# Patient Record
Sex: MALE | Race: WHITE | NOT HISPANIC OR LATINO | URBAN - METROPOLITAN AREA
[De-identification: names, ages, dates, MRNs, and addresses within clinical notes are randomized per-mention and may not be internally consistent; named-entity substitution may affect disease eponyms.]

---

## 2021-08-26 ENCOUNTER — OFFICE VISIT (OUTPATIENT)
Dept: DERMATOLOGY | Facility: CLINIC | Age: 56
End: 2021-08-26
Payer: COMMERCIAL

## 2021-08-26 VITALS — OXYGEN SATURATION: 98 % | DIASTOLIC BLOOD PRESSURE: 79 MMHG | SYSTOLIC BLOOD PRESSURE: 126 MMHG | HEART RATE: 57 BPM

## 2021-08-26 DIAGNOSIS — L81.4 LENTIGO: ICD-10-CM

## 2021-08-26 DIAGNOSIS — D18.01 CHERRY ANGIOMA: ICD-10-CM

## 2021-08-26 DIAGNOSIS — L82.1 SEBORRHEIC KERATOSIS: Primary | ICD-10-CM

## 2021-08-26 DIAGNOSIS — D22.9 MULTIPLE BENIGN NEVI: ICD-10-CM

## 2021-08-26 PROCEDURE — 99203 OFFICE O/P NEW LOW 30 MIN: CPT | Performed by: PHYSICIAN ASSISTANT

## 2021-08-26 RX ORDER — LEVOTHYROXINE SODIUM 75 UG/1
75 TABLET ORAL DAILY
COMMUNITY

## 2021-08-26 NOTE — NURSING NOTE
Chief Complaint   Patient presents with     Skin Check       Vitals:    08/26/21 0937   BP: 126/79   BP Location: Right arm   Patient Position: Sitting   Cuff Size: Adult Large   Pulse: 57   SpO2: 98%     Wt Readings from Last 1 Encounters:   No data found for Wt       Brittany Escalante LPN .................8/26/2021

## 2021-08-26 NOTE — LETTER
8/26/2021         RE: Maynor Paris  [address] [facility] [place] [address]        Dear Colleague,    Thank you for referring your patient, Maynor Paris, to the [facility]. Please see a copy of my visit note below.    Maynor Paris is an extremely pleasant 56 year old year old male patient here today for  Patient has no other skin complaints today.  Remainder of the HPI, Meds, PMH, Allergies, FH, and SH was reviewed in chart.    Pertinent Hx:  No personal history of skincancer.  Family history of skin cancer.   History reviewed. No pertinent past medical history.    No past surgical history on file.     Family History   Problem Relation Age of Onset     Skin Cancer Father         BCC, SCC, & Melanoma        Social History     Socioeconomic History     Marital status:      Spouse name: Not on file     Number of children: Not on file     Years of education: Not on file     Highest education level: Not on file   Occupational History     Not on file   Tobacco Use     Smoking status: Never Smoker     Smokeless tobacco: Never Used   Substance and Sexual Activity     Alcohol use: Not on file     Drug use: Not on file     Sexual activity: Not on file   Other Topics Concern     Not on file   Social History Narrative     Not on file     Social Determinants of Health     Financial Resource Strain:      Difficulty of Paying Living Expenses:    Food Insecurity:      Worried About Running Out of Food in the Last Year:      Ran Out of Food in the Last Year:    Transportation Needs:      Lack of Transportation (Medical):      Lack of Transportation (Non-Medical):    Physical Activity:      Days of Exercise per Week:      Minutes of Exercise per Session:    Stress:      Feeling of Stress :    Social Connections:      Frequency of Communication with Friends and Family:      Frequency of Social Gatherings with Friends and Family:      Attends Jain Services:      Active Member of Clubs or  Organizations:      Attends Club or Organization Meetings:      Marital Status:    Intimate Partner Violence:      Fear of Current or Ex-Partner:      Emotionally Abused:      Physically Abused:      Sexually Abused:        Outpatient Encounter Medications as of 8/26/2021   Medication Sig Dispense Refill     levothyroxine (SYNTHROID/LEVOTHROID) 75 MCG tablet Take 75 mcg by mouth daily       No facility-administered encounter medications on file as of 8/26/2021.             O:   NAD, WDWN, Alert & Oriented, Mood & Affect wnl, Vitals stable   Here today alone   /79 (BP Location: Right arm, Patient Position: Sitting, Cuff Size: Adult Large)   Pulse 57   SpO2 98%    General appearance normal   Vitals stable   Alert, oriented and in no acute distress     Brown papules and macules with regular pigment network and borders on torso and extremities   Stuck on papules and brown macules on trunk and ext   Red papules on trunk     The remainder of skin exam is normal       Eyes: Conjunctivae/lids:Normal     ENT: Lips: normal    MSK:Normal    Pulm: Breathing Normal    Neuro/Psych: Orientation:Alert and Orientedx3 ; Mood/Affect:normal   A/P:  1. Seborrheic keratosis, lentigo, angioma, benign nevi   BENIGN LESIONS DISCUSSED WITH PATIENT:  I discussed the specifics of tumor, prognosis, and genetics of benign lesions.  I explained that treatment of these lesions would be purely cosmetic and not medically neccessary.  I discussed with patient different removal options including excision, cautery and /or laser.      Nature and genetics of benign skin lesions dicussed with patient.  Signs and Symptoms of skin cancer discussed with patient.  ABCDEs of melanoma reviewed with patient.  Patient encouraged to perform monthly skin exams.  UV precautions reviewed with patient  Risks of non-melanoma skin cancer discussed with patient   Return to clinic in one year or sooner if needed.         Again, thank you for allowing me to  participate in the care of your patient.        Sincerely,        Celine Dey PA-C

## 2021-08-30 NOTE — PROGRESS NOTES
Maynor Paris is an extremely pleasant 56 year old year old male patient here today for  Patient has no other skin complaints today.  Remainder of the HPI, Meds, PMH, Allergies, FH, and SH was reviewed in chart.    Pertinent Hx:  No personal history of skincancer.  Family history of skin cancer.   History reviewed. No pertinent past medical history.    No past surgical history on file.     Family History   Problem Relation Age of Onset     Skin Cancer Father         BCC, SCC, & Melanoma        Social History     Socioeconomic History     Marital status:      Spouse name: Not on file     Number of children: Not on file     Years of education: Not on file     Highest education level: Not on file   Occupational History     Not on file   Tobacco Use     Smoking status: Never Smoker     Smokeless tobacco: Never Used   Substance and Sexual Activity     Alcohol use: Not on file     Drug use: Not on file     Sexual activity: Not on file   Other Topics Concern     Not on file   Social History Narrative     Not on file     Social Determinants of Health     Financial Resource Strain:      Difficulty of Paying Living Expenses:    Food Insecurity:      Worried About Running Out of Food in the Last Year:      Ran Out of Food in the Last Year:    Transportation Needs:      Lack of Transportation (Medical):      Lack of Transportation (Non-Medical):    Physical Activity:      Days of Exercise per Week:      Minutes of Exercise per Session:    Stress:      Feeling of Stress :    Social Connections:      Frequency of Communication with Friends and Family:      Frequency of Social Gatherings with Friends and Family:      Attends Orthodoxy Services:      Active Member of Clubs or Organizations:      Attends Club or Organization Meetings:      Marital Status:    Intimate Partner Violence:      Fear of Current or Ex-Partner:      Emotionally Abused:      Physically Abused:      Sexually Abused:        Outpatient Encounter  Medications as of 8/26/2021   Medication Sig Dispense Refill     levothyroxine (SYNTHROID/LEVOTHROID) 75 MCG tablet Take 75 mcg by mouth daily       No facility-administered encounter medications on file as of 8/26/2021.             O:   NAD, WDWN, Alert & Oriented, Mood & Affect wnl, Vitals stable   Here today alone   /79 (BP Location: Right arm, Patient Position: Sitting, Cuff Size: Adult Large)   Pulse 57   SpO2 98%    General appearance normal   Vitals stable   Alert, oriented and in no acute distress     Brown papules and macules with regular pigment network and borders on torso and extremities   Stuck on papules and brown macules on trunk and ext   Red papules on trunk     The remainder of skin exam is normal       Eyes: Conjunctivae/lids:Normal     ENT: Lips: normal    MSK:Normal    Pulm: Breathing Normal    Neuro/Psych: Orientation:Alert and Orientedx3 ; Mood/Affect:normal   A/P:  1. Seborrheic keratosis, lentigo, angioma, benign nevi   BENIGN LESIONS DISCUSSED WITH PATIENT:  I discussed the specifics of tumor, prognosis, and genetics of benign lesions.  I explained that treatment of these lesions would be purely cosmetic and not medically neccessary.  I discussed with patient different removal options including excision, cautery and /or laser.      Nature and genetics of benign skin lesions dicussed with patient.  Signs and Symptoms of skin cancer discussed with patient.  ABCDEs of melanoma reviewed with patient.  Patient encouraged to perform monthly skin exams.  UV precautions reviewed with patient  Risks of non-melanoma skin cancer discussed with patient   Return to clinic in one year or sooner if needed.

## 2023-11-18 ENCOUNTER — HEALTH MAINTENANCE LETTER (OUTPATIENT)
Age: 58
End: 2023-11-18

## 2024-03-12 ENCOUNTER — OFFICE VISIT (OUTPATIENT)
Dept: DERMATOLOGY | Facility: CLINIC | Age: 59
End: 2024-03-12
Payer: COMMERCIAL

## 2024-03-12 DIAGNOSIS — L82.1 SEBORRHEIC KERATOSIS: ICD-10-CM

## 2024-03-12 DIAGNOSIS — L21.9 DERMATITIS, SEBORRHEIC: Primary | ICD-10-CM

## 2024-03-12 DIAGNOSIS — L82.0 INFLAMED SEBORRHEIC KERATOSIS: ICD-10-CM

## 2024-03-12 DIAGNOSIS — D22.9 MULTIPLE BENIGN NEVI: ICD-10-CM

## 2024-03-12 DIAGNOSIS — D18.01 CHERRY ANGIOMA: ICD-10-CM

## 2024-03-12 DIAGNOSIS — L81.4 LENTIGO: ICD-10-CM

## 2024-03-12 PROCEDURE — 99213 OFFICE O/P EST LOW 20 MIN: CPT | Mod: 25 | Performed by: PHYSICIAN ASSISTANT

## 2024-03-12 PROCEDURE — 17110 DESTRUCTION B9 LES UP TO 14: CPT | Performed by: PHYSICIAN ASSISTANT

## 2024-03-12 RX ORDER — KETOCONAZOLE 20 MG/ML
SHAMPOO TOPICAL
Qty: 120 ML | Refills: 5 | Status: SHIPPED | OUTPATIENT
Start: 2024-03-12

## 2024-03-12 ASSESSMENT — PAIN SCALES - GENERAL: PAINLEVEL: NO PAIN (0)

## 2024-03-12 NOTE — PROGRESS NOTES
Maynor Paris is an extremely pleasant 58 year old year old male patient here today for rough spots on face. He notes that he will pick spot, but they keep returning. No pain and will bleed when picked at.  Patient has no other skin complaints today.  Remainder of the HPI, Meds, PMH, Allergies, FH, and SH was reviewed in chart.    Pertinent Hx:   No personal history of skin cancer. Family history of melanoma   History reviewed. No pertinent past medical history.    No past surgical history on file.     Family History   Problem Relation Age of Onset    Skin Cancer Father         BCC, SCC, & Melanoma        Social History     Socioeconomic History    Marital status:      Spouse name: Not on file    Number of children: Not on file    Years of education: Not on file    Highest education level: Not on file   Occupational History    Not on file   Tobacco Use    Smoking status: Never    Smokeless tobacco: Never   Substance and Sexual Activity    Alcohol use: Not on file    Drug use: Not on file    Sexual activity: Not on file   Other Topics Concern    Not on file   Social History Narrative    Not on file     Social Determinants of Health     Financial Resource Strain: Not on file   Food Insecurity: Not on file   Transportation Needs: Not on file   Physical Activity: Not on file   Stress: Not on file   Social Connections: Not on file   Interpersonal Safety: Not on file   Housing Stability: Not on file       Outpatient Encounter Medications as of 3/12/2024   Medication Sig Dispense Refill    ketoconazole (NIZORAL) 2 % external shampoo Leave on scalp for 5 minutes. Use 2-3 times weekly. 120 mL 5    levothyroxine (SYNTHROID/LEVOTHROID) 75 MCG tablet Take 75 mcg by mouth daily       No facility-administered encounter medications on file as of 3/12/2024.             O:   NAD, WDWN, Alert & Oriented, Mood & Affect wnl, Vitals stable   Here today alone   There were no vitals taken for this visit.   General appearance  normal   Vitals stable   Alert, oriented and in no acute distress      Brown stuck on papules on face   Stuck on papules and brown macules on trunk and ext   Red papules on trunk  Brown papules and macules with regular pigment network and borders on torso and extremities    The remainder of skin exam is normal       Eyes: Conjunctivae/lids:Normal     ENT: Lips: normal    MSK:Normal    Cardiovascular: peripheral edema none    Pulm: Breathing Normal    Neuro/Psych: Orientation:Alert and Orientedx3 ; Mood/Affect:normal   A/P:  1. Inflamed seborrheic keratosis on left temple, right cheek and left anterior thigh x 3  LN2:  Treated with LN2 for 5s for 1-2 cycles. Warned risks of blistering, pain, pigment change, scarring, and incomplete resolution.  Advised patient to return if lesions do not completely resolve.  Wound care sheet given.  2. Seborrheic dermatitis   Use ketoconazole shampoo  leave on for 5 minutes then rinse.   3. Seborrheic keratosis, lentigo, angioma, benign nevi   It was a pleasure speaking to Maynor Paris today.  BENIGN LESIONS DISCUSSED WITH PATIENT:  I discussed the specifics of tumor, prognosis, and genetics of benign lesions.  I explained that treatment of these lesions would be purely cosmetic and not medically neccessary.  I discussed with patient different removal options including excision, cautery and /or laser.      Nature and genetics of benign skin lesions dicussed with patient.  Signs and Symptoms of skin cancer discussed with patient.  ABCDEs of melanoma reviewed with patient.  Patient encouraged to perform monthly skin exams.  UV precautions reviewed with patient.  Risks of non-melanoma skin cancer discussed with patient   Return to clinic in one year or sooner if needed.

## 2024-03-12 NOTE — LETTER
3/12/2024         RE: Maynor Paris  [address] [facility] [address]        Dear Colleague,    Thank you for referring your patient, Maynor Paris, to the [facility]. Please see a copy of my visit note below.    Maynor Paris is an extremely pleasant 58 year old year old male patient here today for rough spots on face. He notes that he will pick spot, but they keep returning. No pain and will bleed when picked at.  Patient has no other skin complaints today.  Remainder of the HPI, Meds, PMH, Allergies, FH, and SH was reviewed in chart.    Pertinent Hx:   No personal history of skin cancer. Family history of melanoma   History reviewed. No pertinent past medical history.    No past surgical history on file.     Family History   Problem Relation Age of Onset     Skin Cancer Father         BCC, SCC, & Melanoma        Social History     Socioeconomic History     Marital status:      Spouse name: Not on file     Number of children: Not on file     Years of education: Not on file     Highest education level: Not on file   Occupational History     Not on file   Tobacco Use     Smoking status: Never     Smokeless tobacco: Never   Substance and Sexual Activity     Alcohol use: Not on file     Drug use: Not on file     Sexual activity: Not on file   Other Topics Concern     Not on file   Social History Narrative     Not on file     Social Determinants of Health     Financial Resource Strain: Not on file   Food Insecurity: Not on file   Transportation Needs: Not on file   Physical Activity: Not on file   Stress: Not on file   Social Connections: Not on file   Interpersonal Safety: Not on file   Housing Stability: Not on file       Outpatient Encounter Medications as of 3/12/2024   Medication Sig Dispense Refill     ketoconazole (NIZORAL) 2 % external shampoo Leave on scalp for 5 minutes. Use 2-3 times weekly. 120 mL 5     levothyroxine (SYNTHROID/LEVOTHROID) 75 MCG tablet Take 75  mcg by mouth daily       No facility-administered encounter medications on file as of 3/12/2024.             O:   NAD, WDWN, Alert & Oriented, Mood & Affect wnl, Vitals stable   Here today alone   There were no vitals taken for this visit.   General appearance normal   Vitals stable   Alert, oriented and in no acute distress      Brown stuck on papules on face   Stuck on papules and brown macules on trunk and ext   Red papules on trunk  Brown papules and macules with regular pigment network and borders on torso and extremities    The remainder of skin exam is normal       Eyes: Conjunctivae/lids:Normal     ENT: Lips: normal    MSK:Normal    Cardiovascular: peripheral edema none    Pulm: Breathing Normal    Neuro/Psych: Orientation:Alert and Orientedx3 ; Mood/Affect:normal   A/P:  1. Inflamed seborrheic keratosis on left temple, right cheek and left anterior thigh x 3  LN2:  Treated with LN2 for 5s for 1-2 cycles. Warned risks of blistering, pain, pigment change, scarring, and incomplete resolution.  Advised patient to return if lesions do not completely resolve.  Wound care sheet given.  2. Seborrheic dermatitis   Use ketoconazole shampoo  leave on for 5 minutes then rinse.   3. Seborrheic keratosis, lentigo, angioma, benign nevi   It was a pleasure speaking to Maynor Paris today.  BENIGN LESIONS DISCUSSED WITH PATIENT:  I discussed the specifics of tumor, prognosis, and genetics of benign lesions.  I explained that treatment of these lesions would be purely cosmetic and not medically neccessary.  I discussed with patient different removal options including excision, cautery and /or laser.      Nature and genetics of benign skin lesions dicussed with patient.  Signs and Symptoms of skin cancer discussed with patient.  ABCDEs of melanoma reviewed with patient.  Patient encouraged to perform monthly skin exams.  UV precautions reviewed with patient.  Risks of non-melanoma skin cancer discussed with patient   Return  to clinic in one year or sooner if needed.       Again, thank you for allowing me to participate in the care of your patient.        Sincerely,        Celine Dey PA-C

## 2024-03-12 NOTE — NURSING NOTE
Chief Complaint   Patient presents with    Skin Check     Spot on left temple and right medial cheek        There were no vitals filed for this visit.  Wt Readings from Last 1 Encounters:   No data found for Wt       Brittany Escalante LPN .................3/12/2024

## 2024-06-15 ENCOUNTER — HEALTH MAINTENANCE LETTER (OUTPATIENT)
Age: 59
End: 2024-06-15

## 2024-10-31 NOTE — PROGRESS NOTES
ENT Consultation    Maynor Paris who is a 59 year old male seen in consultation at the request of self.      History of Present Illness - Maynor Paris is a 59 year old male presents for evaluation of his ears.  He had a left tympanomastoidectomy done in the about 10 years ago at [facility].  Since then was initially fine had a tube and but the last few years has had more problems with pressure sensation occasional popping and relief but mostly plugged ear on the left the right does not bother him.  He also feels his hearing has declined a little bit on the left after the pressure in his ear.  He has not had any discharge from the ear.        BP Readings from Last 1 Encounters:   08/26/21 126/79       BP noted to be well controlled today in office.     Maynor IS NOT a smoker/uses chewing tobacco.     Past Medical History - No past medical history on file.    Current Medications -   Current Outpatient Medications:     ketoconazole (NIZORAL) 2 % external shampoo, Leave on scalp for 5 minutes. Use 2-3 times weekly., Disp: 120 mL, Rfl: 5    levothyroxine (SYNTHROID/LEVOTHROID) 75 MCG tablet, Take 75 mcg by mouth daily, Disp: , Rfl:     Allergies - No Known Allergies    Social History -   Social History     Socioeconomic History    Marital status:    Tobacco Use    Smoking status: Never    Smokeless tobacco: Never     Social Drivers of Health     Financial Resource Strain: Low Risk  (6/11/2023)    Received from [facility]    Financial Resource Strain     Difficulty of Paying Living Expenses: 3   Food Insecurity: No Food Insecurity (6/11/2023)    Received from [facility]    Food Insecurity     Worried About Running Out of Food in the Last Year: 1   Transportation Needs: No Transportation Needs (6/11/2023)    Received from [facility]    Transportation Needs     Lack of Transportation (Medical): 1   Social Connections:  Socially Integrated (6/11/2023)    [facility] from [facility]    Social Connections     Frequency of Communication with Friends and Family: 0   Housing Stability: Low Risk  (6/11/2023)    Received from [facility]    Housing Stability     Unable to Pay for Housing in the Last Year: 1       Family History -   Family History   Problem Relation Age of Onset    Skin Cancer Father         BCC, SCC, & Melanoma        Review of Systems - As per HPI and PMHx, otherwise review of system review of the head and neck negative. Otherwise 10+ review of system is negative    Physical Exam  There were no vitals taken for this visit.  BMI: There is no height or weight on file to calculate BMI.    General - The patient is well nourished and well developed, and appears to have good nutritional status.  Alert and oriented to person and place, answers questions and cooperates with examination appropriately.    SKIN - No suspicious lesions or rashes.  Respiration - No respiratory distress.  Head and Face - Normocephalic and atraumatic, with no gross asymmetry noted of the contour of the facial features.  The facial nerve is intact, with strong symmetric movements.    Voice and Breathing - The patient was breathing comfortably without the use of accessory muscles. The patients voice was clear and strong, and had appropriate pitch and quality.    Ears - Bilateral pinna and EACs with normal appearing overlying skin.  Exam of the left ear shows a tympanic membrane retracted with bubbles of fluid seen behind it.  Ear canals clear and dry.  On the right side we see some Maring sclerosis without significant retraction.  Ear canals clear and dry.    Eyes - Extraocular movements intact.  Sclera were not icteric or injected, conjunctiva were pink and moist.    Mouth - Examination of the oral cavity showed pink, healthy oral mucosa. No lesions or ulcerations noted.  The tongue was  mobile and midline, and the dentition were in good condition.      Throat - The walls of the oropharynx were smooth, pink, moist, symmetric, and had no lesions or ulcerations.  The tonsillar pillars and soft palate were symmetric.  The uvula was midline on elevation.    Neck - Normal midline excursion of the laryngotracheal complex during swallowing.  Full range of motion on passive movement.  Palpation of the occipital, submental, submandibular, internal jugular chain, and supraclavicular nodes did not demonstrate any abnormal lymph nodes or masses.  The carotid pulse was palpable bilaterally.  Palpation of the thyroid was soft and smooth, with no nodules or goiter appreciated.  The trachea was mobile and midline.    Nose - External contour is symmetric, no gross deflection or scars.  Nasal mucosa is pink and moist with no abnormal mucus.  The septum was deviated to the left and partly obstructive, turbinates of normal size and position.  No polyps, masses, or purulence noted on examination.    Neuro - Nonfocal neuro exam is normal, CN 2 through 12 intact, normal gait and muscle tone.    Audiogram was performed today that was abnormal.  Type C tympanogram with a robust peak was noted on the right with normal canal volume and type B tympanogram with normal canal volume on the left.  Mild sensorineural hearing loss appreciated on the right.  Moderate mixed loss on the left.  Word recognition score 92% on the right versus 100% on the left.  Performed in clinic today: Nasopharyngoscopy was performed to make sure that this long-lasting process does not reflect significant eustachian tube obstruction.  To further evaluate the nasal cavity, I performed rigid nasal endoscopy.  I first sprayed the nasal cavity bilaterally with a mix of lidocaine and neosynephrine.  I then began on the left side using a 2.7mm, 30 degree rigid nasal endoscope.  The septum was deviated and the nasal airway was obstructed.  No abnormal  secretions, purulence, or polyps were noted. The left middle turbinate and middle meatus were clearly visualized and normal in appearance.  Looking up, the olfactory cleft was unobstructed.  Going further back, the sphenoethmoid recess was normal in appearance, with healthy appearing mucosa on the face of the sphenoid.  The nasopharynx was unremarkable, and the eustachian tube opening on this side was unobstructed.  Torus tubarius and fossa of Rosenmuller were clear.     Red - [id number] [facility]      A/P - Maynor Paris is a 59 year old male patient with chronic serous otitis media involving his left ear.  This is the ear where he had tympanomastoidectomy.  At this point we discussed different options.  Patient is interested in left myringotomy and tube placement.  He understands risks of the tube such as perforation post tube otorrhea retained tube and wished to go ahead with it.  Will proceed with T-tube for longer duration of ventilation.    Dominick Mueller MD

## 2024-11-11 ENCOUNTER — OFFICE VISIT (OUTPATIENT)
Dept: OTOLARYNGOLOGY | Facility: CLINIC | Age: 59
End: 2024-11-11
Payer: COMMERCIAL

## 2024-11-11 ENCOUNTER — OFFICE VISIT (OUTPATIENT)
Dept: AUDIOLOGY | Facility: CLINIC | Age: 59
End: 2024-11-11
Payer: COMMERCIAL

## 2024-11-11 VITALS
TEMPERATURE: 97.7 F | SYSTOLIC BLOOD PRESSURE: 128 MMHG | WEIGHT: 220 LBS | HEIGHT: 76 IN | BODY MASS INDEX: 26.79 KG/M2 | DIASTOLIC BLOOD PRESSURE: 79 MMHG

## 2024-11-11 DIAGNOSIS — H65.22 CHRONIC SEROUS OTITIS MEDIA, LEFT EAR: Primary | ICD-10-CM

## 2024-11-11 DIAGNOSIS — J34.2 DEVIATED NASAL SEPTUM: ICD-10-CM

## 2024-11-11 DIAGNOSIS — H90.A32 MIXED CONDUCTIVE AND SENSORINEURAL HEARING LOSS OF LEFT EAR WITH RESTRICTED HEARING OF RIGHT EAR: ICD-10-CM

## 2024-11-11 DIAGNOSIS — H90.A21 SENSORINEURAL HEARING LOSS (SNHL) OF RIGHT EAR WITH RESTRICTED HEARING OF LEFT EAR: ICD-10-CM

## 2024-11-11 DIAGNOSIS — H90.A32 MIXED CONDUCTIVE AND SENSORINEURAL HEARING LOSS OF LEFT EAR WITH RESTRICTED HEARING OF RIGHT EAR: Primary | ICD-10-CM

## 2024-11-11 PROCEDURE — 99203 OFFICE O/P NEW LOW 30 MIN: CPT | Mod: 25 | Performed by: OTOLARYNGOLOGY

## 2024-11-11 PROCEDURE — 92557 COMPREHENSIVE HEARING TEST: CPT | Performed by: AUDIOLOGIST

## 2024-11-11 PROCEDURE — 92550 TYMPANOMETRY & REFLEX THRESH: CPT | Performed by: AUDIOLOGIST

## 2024-11-11 PROCEDURE — 92511 NASOPHARYNGOSCOPY: CPT | Performed by: OTOLARYNGOLOGY

## 2024-11-11 NOTE — LETTER
11/11/2024      Maynor Paris  [address] [facility] [address]      Dear Colleague,    Thank you for referring your patient, Maynor Paris, to the [facility]. Please see a copy of my visit note below.    ENT Consultation    Maynor Paris who is a 59 year old male seen in consultation at the request of self.      History of Present Illness - Maynor Paris is a 59 year old male presents for evaluation of his ears.  He had a left tympanomastoidectomy done in the about 10 years ago at [facility].  Since then was initially fine had a tube and but the last few years has had more problems with pressure sensation occasional popping and relief but mostly plugged ear on the left the right does not bother him.  He also feels his hearing has declined a little bit on the left after the pressure in his ear.  He has not had any discharge from the ear.        BP Readings from Last 1 Encounters:   08/26/21 126/79       BP noted to be well controlled today in office.     Maynor IS NOT a smoker/uses chewing tobacco.     Past Medical History - No past medical history on file.    Current Medications -   Current Outpatient Medications:      ketoconazole (NIZORAL) 2 % external shampoo, Leave on scalp for 5 minutes. Use 2-3 times weekly., Disp: 120 mL, Rfl: 5     levothyroxine (SYNTHROID/LEVOTHROID) 75 MCG tablet, Take 75 mcg by mouth daily, Disp: , Rfl:     Allergies - No Known Allergies    Social History -   Social History     Socioeconomic History     Marital status:    Tobacco Use     Smoking status: Never     Smokeless tobacco: Never     Social Drivers of Health     Financial Resource Strain: Low Risk  (6/11/2023)    Received from [employer]    Financial Resource Strain      Difficulty of Paying Living Expenses: 3   Food Insecurity: No Food Insecurity (6/11/2023)    Received from [employer]    Food Insecurity      Worried About  Running Out of Food in the Last Year: 1   Transportation Needs: No Transportation Needs (6/11/2023)    Received from [employer] [facility]    Transportation Needs      Lack of Transportation (Medical): 1   Social Connections: Socially Integrated (6/11/2023)    Received from [employer] [facility]    Social Connections      Frequency of Communication with Friends and Family: 0   Housing Stability: Low Risk  (6/11/2023)    Received from [employer] [facility]    Housing Stability      Unable to Pay for Housing in the Last Year: 1       Family History -   Family History   Problem Relation Age of Onset     Skin Cancer Father         BCC, SCC, & Melanoma        Review of Systems - As per HPI and PMHx, otherwise review of system review of the head and neck negative. Otherwise 10+ review of system is negative    Physical Exam  There were no vitals taken for this visit.  BMI: There is no height or weight on file to calculate BMI.    General - The patient is well nourished and well developed, and appears to have good nutritional status.  Alert and oriented to person and place, answers questions and cooperates with examination appropriately.    SKIN - No suspicious lesions or rashes.  Respiration - No respiratory distress.  Head and Face - Normocephalic and atraumatic, with no gross asymmetry noted of the contour of the facial features.  The facial nerve is intact, with strong symmetric movements.    Voice and Breathing - The patient was breathing comfortably without the use of accessory muscles. The patients voice was clear and strong, and had appropriate pitch and quality.    Ears - Bilateral pinna and EACs with normal appearing overlying skin.  Exam of the left ear shows a tympanic membrane retracted with bubbles of fluid seen behind it.  Ear canals clear and dry.  On the right side we see some Maring sclerosis without significant retraction.  Ear canals  clear and dry.    Eyes - Extraocular movements intact.  Sclera were not icteric or injected, conjunctiva were pink and moist.    Mouth - Examination of the oral cavity showed pink, healthy oral mucosa. No lesions or ulcerations noted.  The tongue was mobile and midline, and the dentition were in good condition.      Throat - The walls of the oropharynx were smooth, pink, moist, symmetric, and had no lesions or ulcerations.  The tonsillar pillars and soft palate were symmetric.  The uvula was midline on elevation.    Neck - Normal midline excursion of the laryngotracheal complex during swallowing.  Full range of motion on passive movement.  Palpation of the occipital, submental, submandibular, internal jugular chain, and supraclavicular nodes did not demonstrate any abnormal lymph nodes or masses.  The carotid pulse was palpable bilaterally.  Palpation of the thyroid was soft and smooth, with no nodules or goiter appreciated.  The trachea was mobile and midline.    Nose - External contour is symmetric, no gross deflection or scars.  Nasal mucosa is pink and moist with no abnormal mucus.  The septum was deviated to the left and partly obstructive, turbinates of normal size and position.  No polyps, masses, or purulence noted on examination.    Neuro - Nonfocal neuro exam is normal, CN 2 through 12 intact, normal gait and muscle tone.    Audiogram was performed today that was abnormal.  Type C tympanogram with a robust peak was noted on the right with normal canal volume and type B tympanogram with normal canal volume on the left.  Mild sensorineural hearing loss appreciated on the right.  Moderate mixed loss on the left.  Word recognition score 92% on the right versus 100% on the left.  Performed in clinic today: Nasopharyngoscopy was performed to make sure that this long-lasting process does not reflect significant eustachian tube obstruction.  To further evaluate the nasal cavity, I performed rigid nasal endoscopy.   I first sprayed the nasal cavity bilaterally with a mix of lidocaine and neosynephrine.  I then began on the left side using a 2.7mm, 30 degree rigid nasal endoscope.  The septum was deviated and the nasal airway was obstructed.  No abnormal secretions, purulence, or polyps were noted. The left middle turbinate and middle meatus were clearly visualized and normal in appearance.  Looking up, the olfactory cleft was unobstructed.  Going further back, the sphenoethmoid recess was normal in appearance, with healthy appearing mucosa on the face of the sphenoid.  The nasopharynx was unremarkable, and the eustachian tube opening on this side was unobstructed.  Torus tubarius and fossa of Rosenmuller were clear.     Red - [id number] [facility]      A/P - Maynor Paris is a 59 year old male patient with chronic serous otitis media involving his left ear.  This is the ear where he had tympanomastoidectomy.  At this point we discussed different options.  Patient is interested in left myringotomy and tube placement.  He understands risks of the tube such as perforation post tube otorrhea retained tube and wished to go ahead with it.  Will proceed with T-tube for longer duration of ventilation.    Dominick Mueller MD       Again, thank you for allowing me to participate in the care of your patient.        Sincerely,        Dominick Mueller MD, MD

## 2024-11-11 NOTE — PROGRESS NOTES
AUDIOLOGY REPORT     SUMMARY: Audiology visit completed. See audiogram for results.     RECOMMENDATIONS: Follow-up with ENT    Diana Vazquez Licensed Audiologist #[id number]

## 2024-12-05 NOTE — PROGRESS NOTES
Maynor Paris is a 59 year old male who presents for PE tube placement.  Consent was obtained.  Procedure/risks were explained.    Patient has mucoid otitis media involving his left ear.  He has had multiple tubes and the symptoms they come out fluid reaccumulate's.  Therefore we discussed placement of T-tube.      PROCEDURE: Patient understands risks of perforation with T-tube otorrhea and wants to go ahead with it.  A myringotomy was performed postero-inferior in the left tympanic membrane.  A T-tube was inserted in the left TM.  Anesthetic used was Phenol topica with a dosage of less than 1 mL.  Patient tolerated the procedure well.    Patient will follow-up in a couple months with audiogram.  Dominick Mueller MD

## 2024-12-09 ENCOUNTER — OFFICE VISIT (OUTPATIENT)
Dept: OTOLARYNGOLOGY | Facility: CLINIC | Age: 59
End: 2024-12-09
Payer: COMMERCIAL

## 2024-12-09 VITALS
HEART RATE: 104 BPM | TEMPERATURE: 97.1 F | SYSTOLIC BLOOD PRESSURE: 136 MMHG | DIASTOLIC BLOOD PRESSURE: 74 MMHG | HEIGHT: 76 IN | WEIGHT: 218.9 LBS | BODY MASS INDEX: 26.66 KG/M2

## 2024-12-09 DIAGNOSIS — H65.92 MUCOID OTITIS MEDIA WITH EFFUSION, LEFT: Primary | ICD-10-CM

## 2024-12-09 ASSESSMENT — PAIN SCALES - GENERAL: PAINLEVEL_OUTOF10: NO PAIN (0)

## 2024-12-09 NOTE — LETTER
12/9/2024      Maynor Paris  [address] [address] [facility] [address]      Dear Colleague,    Thank you for referring your patient, Maynor Paris, to the [facility]. Please see a copy of my visit note below.    Maynor Paris is a 59 year old male who presents for PE tube placement.  Consent was obtained.  Procedure/risks were explained.    Patient has mucoid otitis media involving his left ear.  He has had multiple tubes and the symptoms they come out fluid reaccumulate's.  Therefore we discussed placement of T-tube.      PROCEDURE: Patient understands risks of perforation with T-tube otorrhea and wants to go ahead with it.  A myringotomy was performed postero-inferior in the left tympanic membrane.  A T-tube was inserted in the left TM.  Anesthetic used was Phenol topica with a dosage of less than 1 mL.  Patient tolerated the procedure well.    Patient will follow-up in a couple months with audiogram.  Dominick Mueller MD       Again, thank you for allowing me to participate in the care of your patient.        Sincerely,        Dominick Mueller MD, MD

## 2025-02-13 NOTE — PROGRESS NOTES
History of Present Illness - Maynor Paris is a 59 year old male presenting in clinic today for a recheck on Patient presents with:  Ear Tube Follow Up    Patient status post left myringotomy with T-tube placement.  He feels remarkably better with the bubbling sensation and pressure altogether gone.  He feels his hearing has improved.      BP Readings from Last 1 Encounters:   02/24/25 112/74       BP noted to be well controlled today in office.     Maynor IS NOT a smoker/uses chewing tobacco.     Past Medical History - History reviewed. No pertinent past medical history.    Current Medications -   Current Outpatient Medications:     ketoconazole (NIZORAL) 2 % external shampoo, Leave on scalp for 5 minutes. Use 2-3 times weekly., Disp: 120 mL, Rfl: 5    levothyroxine (SYNTHROID/LEVOTHROID) 75 MCG tablet, Take 75 mcg by mouth daily, Disp: , Rfl:     Allergies - No Known Allergies    Social History -   Social History     Socioeconomic History    Marital status:    Tobacco Use    Smoking status: Never    Smokeless tobacco: Never   Vaping Use    Vaping status: Never Used     Social Drivers of Health     Financial Resource Strain: Low Risk  (6/11/2023)    [facility]    Financial Resource Strain     Difficulty of Paying Living Expenses: 3   Food Insecurity: No Food Insecurity (6/11/2023)    [facility], [facility]    Food Insecurity     Worried About Running Out of Food in the Last Year: 1   Transportation Needs: No Transportation Needs (6/11/2023)    [facility], [facility]    Transportation Needs     Lack of Transportation (Medical): 1    [facility]    Social Connections   Housing Stability: Low Risk  (6/11/2023)     [facility] [employer] [facility]    Housing Stability     Unable to Pay for Housing in the Last Year: 1       Family History -   Family History   Problem Relation Age of Onset    Skin Cancer Father         BCC, SCC, & Melanoma        Review of Systems - As per HPI and PMHx, otherwise review of system review of the head and neck negative. Otherwise 10+ review of system is negative    Physical Exam  /74   Temp 97.8  F (36.6  C) (Temporal)   Ht 1.918 m (6' 3.5\")   Wt 99.3 kg (218 lb 14.4 oz)   BMI 27.00 kg/m    BMI: Body mass index is 27 kg/m .    General - The patient is well nourished and well developed, and appears to have good nutritional status.  Alert and oriented to person and place, answers questions and cooperates with examination appropriately.    SKIN - No suspicious lesions or rashes.  Respiration - No respiratory distress.  Head and Face - Normocephalic and atraumatic, with no gross asymmetry noted of the contour of the facial features.  The facial nerve is intact, with strong symmetric movements.    Voice and Breathing - The patient was breathing comfortably without the use of accessory muscles. The patients voice was clear and strong, and had appropriate pitch and quality.    Ears - Bilateral pinna and EACs with normal appearing overlying skin.  Right tympanic membrane intact with good mobility on pneumatic otoscopy . Bony landmarks of the ossicular chain are normal. The tympanic membrane is normal in appearance. No retraction, perforation, or masses.  No fluid or purulence was seen in the external canal or the middle ear.   On the left side T-tube appears to be in excellent position tympanic membrane is clear.  Eyes - Extraocular movements intact.  Sclera were not icteric or injected, conjunctiva were pink and moist.    Mouth - Examination of the oral cavity showed pink, healthy oral mucosa. No lesions or ulcerations " noted.  The tongue was mobile and midline, and the dentition were in good condition.      Throat - The walls of the oropharynx were smooth, pink, moist, symmetric, and had no lesions or ulcerations.  The tonsillar pillars and soft palate were symmetric.  The uvula was midline on elevation.    Neck - Normal midline excursion of the laryngotracheal complex during swallowing.  Full range of motion on passive movement.  Palpation of the occipital, submental, submandibular, internal jugular chain, and supraclavicular nodes did not demonstrate any abnormal lymph nodes or masses.  The carotid pulse was palpable bilaterally.  Palpation of the thyroid was soft and smooth, with no nodules or goiter appreciated.  The trachea was mobile and midline.    Nose - External contour is symmetric, no gross deflection or scars.  Nasal mucosa is pink and moist with no abnormal mucus.  The septum was midline and non-obstructive, turbinates of normal size and position.  No polyps, masses, or purulence noted on examination.    Neuro - Nonfocal neuro exam is normal, CN 2 through 12 intact, normal gait and muscle tone.      Performed in clinic today:  Audiologic Studies - An audiogram and tympanogram were performed today as part of the evaluation and personally reviewed. The tympanogram shows Type A curves on the right and Type B curves on the left, with right normal and leftLarge  canal volumes and middle ear pressures.  The audiogram showed mild SNHL on the right and mild SNHL on the left.        A/P - Maynor Paris is a 59 year old male Patient presents with:  Ear Tube Follow Up    Patient doing very well after T-tube placement on the left.  Symptoms have resolved.  He hears better.  Now he will work with our audiology towards getting hearing aids to address his sensorineural component of hearing loss    Maynor should follow up in 1 year.      At Maynor next appointment they will need a hearing test.      Dominick Mueller MD

## 2025-02-24 ENCOUNTER — OFFICE VISIT (OUTPATIENT)
Dept: OTOLARYNGOLOGY | Facility: CLINIC | Age: 60
End: 2025-02-24
Payer: COMMERCIAL

## 2025-02-24 ENCOUNTER — APPOINTMENT (OUTPATIENT)
Dept: AUDIOLOGY | Facility: CLINIC | Age: 60
End: 2025-02-24
Payer: COMMERCIAL

## 2025-02-24 ENCOUNTER — OFFICE VISIT (OUTPATIENT)
Dept: AUDIOLOGY | Facility: CLINIC | Age: 60
End: 2025-02-24
Payer: COMMERCIAL

## 2025-02-24 VITALS
WEIGHT: 218.9 LBS | DIASTOLIC BLOOD PRESSURE: 74 MMHG | SYSTOLIC BLOOD PRESSURE: 112 MMHG | BODY MASS INDEX: 26.66 KG/M2 | HEIGHT: 76 IN | TEMPERATURE: 97.8 F

## 2025-02-24 DIAGNOSIS — H69.92 DYSFUNCTION OF LEFT EUSTACHIAN TUBE: ICD-10-CM

## 2025-02-24 DIAGNOSIS — H90.3 SENSORINEURAL HEARING LOSS, BILATERAL: Primary | ICD-10-CM

## 2025-02-24 DIAGNOSIS — Z96.22 STATUS POST MYRINGOTOMY WITH INSERTION OF TUBE: Primary | ICD-10-CM

## 2025-02-24 PROCEDURE — 92591 PR HEARING AID EXAM BINAURAL: CPT | Performed by: AUDIOLOGIST

## 2025-02-24 PROCEDURE — 99213 OFFICE O/P EST LOW 20 MIN: CPT | Performed by: OTOLARYNGOLOGY

## 2025-02-24 PROCEDURE — 92567 TYMPANOMETRY: CPT | Performed by: AUDIOLOGIST

## 2025-02-24 PROCEDURE — 92557 COMPREHENSIVE HEARING TEST: CPT | Performed by: AUDIOLOGIST

## 2025-02-24 NOTE — PROGRESS NOTES
AUDIOLOGY REPORT    SUBJECTIVE:   Maynor Paris is a 59 year old male was seen in the Audiology Clinic at  [facility] on 2/24/25 to discuss concerns with hearing and functional communication difficulties. The patient was unaccompanied. Maynor had his hearing tested today after a T-tube left ear 12/9/2024, normal sloping to moderate rising to minimal to normal sensorineural hearing loss right ear and left ear. The patient was medically evaluated and determined to be cleared for binaural hearing aids by DEVANTE Mueller M.D. Maynor notes difficulty with communication in a variety of listening situations, but mostly listening in a group situations with background noise. Like a restaurant for example.    OBJECTIVE:  Patient is a hearing aid candidate. Patient would like to move forward with a hearing aid evaluation today. Therefore, the patient was presented with different options for amplification to help aid in communication. Discussed styles, levels of technology and monaural vs. binaural fitting.     The hearing aid(s) mutually chosen were:  Binaural: Signia Pure Charge & Go 3 IX  COLOR: Astrid Gold  BATTERY SIZE: rechargeable  EARMOLD/TIPS: 8 mm tulip  CANAL/ LENGTH: 2 S right and left    ASSESSMENT:     ICD-10-CM    1. Sensorineural hearing loss, bilateral  H90.3 Hearing Aid Exam, Binaural (60737)        Reviewed purchase information and warranty information with patient. The 45 day trial period was explained to patient. The patient was given a copy of the [place] Department of [employer] consumer brochure on purchasing hearing instruments. Patient risk factors have been provided to the patient in writing prior to the sale of the hearing aid per FDA regulation. The risk factors are also available in the User Instructional Booklet to be presented on the day of the hearing aid fitting. Hearing aid(s) ordered. Hearing aid evaluation completed.    PLAN:   Maynor is scheduled to return in 2-3 weeks  for a hearing aid fitting and programming. Purchase agreement will be completed on that date. Please contact this clinic with any questions or concerns.      Wendy Vazquez.  [place] Licensed Audiologist #[id number]

## 2025-02-24 NOTE — LETTER
2/24/2025      Maynor Paris  [id number] [facility] [address]      Dear Colleague,    Thank you for referring your patient, Maynor Paris, to the [facility]. Please see a copy of my visit note below.    History of Present Illness - Maynor Paris is a 59 year old male presenting in clinic today for a recheck on Patient presents with:  Ear Tube Follow Up    Patient status post left myringotomy with T-tube placement.  He feels remarkably better with the bubbling sensation and pressure altogether gone.  He feels his hearing has improved.      BP Readings from Last 1 Encounters:   02/24/25 112/74       BP noted to be well controlled today in office.     Maynor IS NOT a smoker/uses chewing tobacco.     Past Medical History - History reviewed. No pertinent past medical history.    Current Medications -   Current Outpatient Medications:      ketoconazole (NIZORAL) 2 % external shampoo, Leave on scalp for 5 minutes. Use 2-3 times weekly., Disp: 120 mL, Rfl: 5     levothyroxine (SYNTHROID/LEVOTHROID) 75 MCG tablet, Take 75 mcg by mouth daily, Disp: , Rfl:     Allergies - No Known Allergies    Social History -   Social History     Socioeconomic History     Marital status:    Tobacco Use     Smoking status: Never     Smokeless tobacco: Never   Vaping Use     Vaping status: Never Used     Social Drivers of Health     Financial Resource Strain: Low Risk  (6/11/2023)    [facility]    Financial Resource Strain      Difficulty of Paying Living Expenses: 3   Food Insecurity: No Food Insecurity (6/11/2023)    [facility]    Food Insecurity      Worried About Running Out of Food in the Last Year: 1   Transportation Needs: No Transportation Needs (6/11/2023)    [facility], [facility]    Transportation Needs      Lack of Transportation (Medical): 1    Received from [facility] & [facility]    Social Connections   Housing Stability: Low Risk  (6/11/2023)    [facility]    Housing Stability      Unable to Pay for Housing in the Last Year: 1       Family History -   Family History   Problem Relation Age of Onset     Skin Cancer Father         BCC, SCC, & Melanoma        Review of Systems - As per HPI and PMHx, otherwise review of system review of the head and neck negative. Otherwise 10+ review of system is negative    Physical Exam  /74   Temp 97.8  F (36.6  C) (Temporal)   Ht 1.918 m (6' 3.5\")   Wt 99.3 kg (218 lb 14.4 oz)   BMI 27.00 kg/m    BMI: Body mass index is 27 kg/m .    General - The patient is well nourished and well developed, and appears to have good nutritional status.  Alert and oriented to person and place, answers questions and cooperates with examination appropriately.    SKIN - No suspicious lesions or rashes.  Respiration - No respiratory distress.  Head and Face - Normocephalic and atraumatic, with no gross asymmetry noted of the contour of the facial features.  The facial nerve is intact, with strong symmetric movements.    Voice and Breathing - The patient was breathing comfortably without the use of accessory muscles. The patients voice was clear and strong, and had appropriate pitch and quality.    Ears - Bilateral pinna and EACs with normal appearing overlying skin.  Right tympanic membrane intact with good mobility on pneumatic otoscopy . Bony landmarks of the ossicular chain are normal. The tympanic membrane is normal in appearance. No retraction, perforation, or masses.  No fluid or purulence was seen in the external canal or the middle ear.   On the left side T-tube appears to be in excellent " position tympanic membrane is clear.  Eyes - Extraocular movements intact.  Sclera were not icteric or injected, conjunctiva were pink and moist.    Mouth - Examination of the oral cavity showed pink, healthy oral mucosa. No lesions or ulcerations noted.  The tongue was mobile and midline, and the dentition were in good condition.      Throat - The walls of the oropharynx were smooth, pink, moist, symmetric, and had no lesions or ulcerations.  The tonsillar pillars and soft palate were symmetric.  The uvula was midline on elevation.    Neck - Normal midline excursion of the laryngotracheal complex during swallowing.  Full range of motion on passive movement.  Palpation of the occipital, submental, submandibular, internal jugular chain, and supraclavicular nodes did not demonstrate any abnormal lymph nodes or masses.  The carotid pulse was palpable bilaterally.  Palpation of the thyroid was soft and smooth, with no nodules or goiter appreciated.  The trachea was mobile and midline.    Nose - External contour is symmetric, no gross deflection or scars.  Nasal mucosa is pink and moist with no abnormal mucus.  The septum was midline and non-obstructive, turbinates of normal size and position.  No polyps, masses, or purulence noted on examination.    Neuro - Nonfocal neuro exam is normal, CN 2 through 12 intact, normal gait and muscle tone.      Performed in clinic today:  Audiologic Studies - An audiogram and tympanogram were performed today as part of the evaluation and personally reviewed. The tympanogram shows Type A curves on the right and Type B curves on the left, with right normal and leftLarge  canal volumes and middle ear pressures.  The audiogram showed mild SNHL on the right and mild SNHL on the left.        A/P - Maynor Paris is a 59 year old male Patient presents with:  Ear Tube Follow Up    Patient doing very well after T-tube placement on the left.  Symptoms have resolved.  He hears better.  Now he will  work with our audiology towards getting hearing aids to address his sensorineural component of hearing loss    Maynor should follow up in 1 year.      At Maynor next appointment they will need a hearing test.      Dominick Mueller MD           Again, thank you for allowing me to participate in the care of your patient.        Sincerely,        Dominick Mueller MD, MD    Electronically signed

## 2025-02-24 NOTE — PROGRESS NOTES
AUDIOLOGY REPORT     SUMMARY: Audiology visit completed. See audiogram for results.     RECOMMENDATIONS: Follow-up with ENT    Diana Vazquez Licensed Audiologist #[id number]

## 2025-03-04 ENCOUNTER — OFFICE VISIT (OUTPATIENT)
Dept: AUDIOLOGY | Facility: CLINIC | Age: 60
End: 2025-03-04
Payer: COMMERCIAL

## 2025-03-04 DIAGNOSIS — H90.3 BILATERAL SENSORINEURAL HEARING LOSS: Primary | ICD-10-CM

## 2025-03-04 PROCEDURE — V5261 HEARING AID, DIGIT, BIN, BTE: HCPCS | Performed by: AUDIOLOGIST

## 2025-03-04 PROCEDURE — V5020 CONFORMITY EVALUATION: HCPCS | Mod: RT | Performed by: AUDIOLOGIST

## 2025-03-04 PROCEDURE — V5160 DISPENSING FEE BINAURAL: HCPCS | Performed by: AUDIOLOGIST

## 2025-03-04 PROCEDURE — V5011 HEARING AID FITTING/CHECKING: HCPCS | Performed by: AUDIOLOGIST

## 2025-03-04 NOTE — PATIENT INSTRUCTIONS

## 2025-03-04 NOTE — PROGRESS NOTES
AUDIOLOGY REPORT    SUBJECTIVE:   Maynor Paris, a 59 year old male, was seen in the Audiology Clinic at [facility] today for a binaural Signia Pure Charge & Go 3 IX hearing aid fitting. Previous test 2/24/2025 showed normal sloping to moderate rising to mild sensorineural hearing loss bilaterally. The patient was given medical clearance to pursue amplification by  DEVANTE Mueller MD. He notes difficulty hearing group situations, especially bar / restaurant situations.      OBJECTIVE:    Prior to fitting, a hearing aid check was performed to ensure device functionality. The hearing aid conformity evaluation was completed.The hearing aids were placed and they provided a good fit. Real-ear-probe-microphone measurements were completed on the [place] system and were a good match to NAL-NL2 target with soft sounds audible, moderate sounds comfortable, and loud sounds below discomfort. UCLs are verified through maximum power output measures and demonstrate appropriate limiting of loud inputs. Mr. Paris reported good volume and sound quality today.        Paired to the [employer] madalyn and his iPhone.    Mr. Paris was oriented to proper hearing aid use, care, cleaning (no water, dry brush), batteries (size rechargeable, insertion/removal, toxicity, low-battery signal), aid insertion/removal, user booklet, warranty information, storage cases, and other hearing aid details. The patient confirmed understanding of hearing aid use and care, and showed proper insertion of hearing aid and batteries while in the office today.     EAR(S) FIT: Binaural  HEARING AID MAKE: Right: Signia; Left: Signia    HEARING AID MODEL #: Right: Pure Charge&Go 3IX; Left: Pure Charge&Go 3IX  HEARING AID STYLE: Right: BTE/CECILE; Left: BTE/CECILE  DOME SIZE: Right:  8mm tulip; Left::  8mm tulip   LENGTH: Right:  #2 S; Left:  #2 S   SERIAL NUMBERS: Right: [id number]; Left: [id number]  WARRANTY END DATE: Right: 3/23/2028; Left:  3/23/2028     ASSESSMENT:   Lynn Sommer Charge & Go 3 IX hearing aid fitting completed today. Verification measures were performed. The 45 day trial period was explained to patient, and they expressed understanding. Mr. Paris signed the Hearing Aid Purchase Agreement and was given a copy, as well as details on his hearing aids. Patient was counseled that exact out of pocket amounts cannot be determined for hearing aid claims being sent to insurance. Any insurance coverage information presented to the patient is an estimate only, and is not a guarantee of payment. Patient has been advised to check with their own insurance.    PLAN:   Mr. Paris will return for follow-up in 2-3 weeks for a hearing aid review appointment. Please call this clinic with questions regarding today s appointment.        Diana Vazquez Licensed Audiologist #[id number]

## 2025-03-10 ENCOUNTER — MEDICAL CORRESPONDENCE (OUTPATIENT)
Dept: HEALTH INFORMATION MANAGEMENT | Facility: CLINIC | Age: 60
End: 2025-03-10
Payer: COMMERCIAL

## 2025-03-18 ENCOUNTER — OFFICE VISIT (OUTPATIENT)
Dept: DERMATOLOGY | Facility: CLINIC | Age: 60
End: 2025-03-18
Attending: PHYSICIAN ASSISTANT
Payer: COMMERCIAL

## 2025-03-18 DIAGNOSIS — L57.0 ACTINIC KERATOSIS: ICD-10-CM

## 2025-03-18 DIAGNOSIS — L81.4 LENTIGO: ICD-10-CM

## 2025-03-18 DIAGNOSIS — D22.9 MULTIPLE BENIGN NEVI: ICD-10-CM

## 2025-03-18 DIAGNOSIS — L21.9 DERMATITIS, SEBORRHEIC: Primary | ICD-10-CM

## 2025-03-18 DIAGNOSIS — L82.1 SEBORRHEIC KERATOSIS: ICD-10-CM

## 2025-03-18 DIAGNOSIS — D18.01 CHERRY ANGIOMA: ICD-10-CM

## 2025-03-18 PROCEDURE — 17000 DESTRUCT PREMALG LESION: CPT | Performed by: PHYSICIAN ASSISTANT

## 2025-03-18 PROCEDURE — 99213 OFFICE O/P EST LOW 20 MIN: CPT | Mod: 25 | Performed by: PHYSICIAN ASSISTANT

## 2025-03-18 PROCEDURE — 17003 DESTRUCT PREMALG LES 2-14: CPT | Performed by: PHYSICIAN ASSISTANT

## 2025-03-18 RX ORDER — FLUOCINONIDE TOPICAL SOLUTION USP, 0.05% 0.5 MG/ML
SOLUTION TOPICAL
Qty: 60 ML | Refills: 4 | Status: SHIPPED | OUTPATIENT
Start: 2025-03-18

## 2025-03-18 RX ORDER — KETOCONAZOLE 20 MG/ML
SHAMPOO, SUSPENSION TOPICAL
Qty: 120 ML | Refills: 5 | Status: SHIPPED | OUTPATIENT
Start: 2025-03-18

## 2025-03-18 NOTE — LETTER
3/18/2025      Maynor Paris  [address] [facility] [address]      Dear Colleague,    Thank you for referring your patient, Maynor Paris, to the [facility]. Please see a copy of my visit note below.    Maynor Paris is a pleasant 59 year old year old male patient here today for rough spots on face. He notes some rough areas on face, no painful or bleeding skin lesions. Uses ketoconazole shampoo occasionally for increased itching related to seb derm. Patient has no other skin complaints today.  Remainder of the HPI, Meds, PMH, Allergies, FH, and SH was reviewed in chart.    Pertinent Hx:   No personal history of skin cancer. Family history of melanoma   History reviewed. No pertinent past medical history.    No past surgical history on file.     Family History   Problem Relation Age of Onset     Skin Cancer Father         BCC, SCC, & Melanoma        Social History     Socioeconomic History     Marital status:      Spouse name: Not on file     Number of children: Not on file     Years of education: Not on file     Highest education level: Not on file   Occupational History     Not on file   Tobacco Use     Smoking status: Never     Smokeless tobacco: Never   Vaping Use     Vaping status: Never Used   Substance and Sexual Activity     Alcohol use: Not on file     Drug use: Not on file     Sexual activity: Not on file   Other Topics Concern     Not on file   Social History Narrative     Not on file     Social Drivers of Health     Financial Resource Strain: Low Risk  (6/11/2023)    [facility] [employer] [facility]    Financial Resource Strain      Difficulty of Paying Living Expenses: 3      Difficulty of Paying Living Expenses: Not on file   Food Insecurity: No Food Insecurity (6/11/2023)    [facility], [employer] [facility]    Food  Insecurity      Worried About Running Out of Food in the Last Year: 1   Transportation Needs: No Transportation Needs (6/11/2023)    Received from [facility], [facility]    Transportation Needs      Lack of Transportation (Medical): 1   Physical Activity: Not on file   Stress: Not on file   Social Connections: Unknown (6/11/2024)    Received from [facility]    Social Connections      Frequency of Communication with Friends and Family: Not on file   Interpersonal Safety: Not on file   Housing Stability: Low Risk  (6/11/2023)    Received from [facility], [facility]    Housing Stability      Unable to Pay for Housing in the Last Year: 1       Outpatient Encounter Medications as of 3/18/2025   Medication Sig Dispense Refill     ketoconazole (NIZORAL) 2 % external shampoo Leave on scalp for 5 minutes. Use 2-3 times weekly. 120 mL 5     levothyroxine (SYNTHROID/LEVOTHROID) 75 MCG tablet Take 75 mcg by mouth daily       No facility-administered encounter medications on file as of 3/18/2025.             O:   NAD, WDWN, Alert & Oriented, Mood & Affect wnl, Vitals stable   Here today alone   There were no vitals taken for this visit.   General appearance normal   Vitals stable   Alert, oriented and in no acute distress      Gritty papule on forehead, right cheek, left cheek, right nasal tip  Stuck on papules and brown macules on trunk and ext   Red papules on trunk  Brown papules and macules with regular pigment network and borders on torso and extremities  Few small scabs on scalp.     The remainder of skin exam is normal       Eyes: Conjunctivae/lids:Normal     ENT: Lips: normal    MSK:Normal    Cardiovascular: peripheral edema none    Pulm: Breathing Normal    Neuro/Psych: Orientation:Alert and Orientedx3 ; Mood/Affect:normal   A/P:  1. Actinic keratoses  forehead, right cheek, left cheek, right nasal tip x 4  LN2:  Treated with LN2 for 5s for 1-2 cycles. Warned risks of blistering, pain, pigment change, scarring, and incomplete resolution.  Advised patient to return if lesions do not completely resolve.  Wound care sheet given.  2. Seborrheic dermatitis   Use ketoconazole shampoo  leave on for 5 minutes then rinse.   Use lidex solution as needed.   3. Seborrheic keratosis, lentigo, angioma, benign nevi   It was a pleasure speaking to Maynor Paris today.  BENIGN LESIONS DISCUSSED WITH PATIENT:  I discussed the specifics of tumor, prognosis, and genetics of benign lesions.  I explained that treatment of these lesions would be purely cosmetic and not medically neccessary.  I discussed with patient different removal options including excision, cautery and /or laser.      Nature and genetics of benign skin lesions dicussed with patient.  Signs and Symptoms of skin cancer discussed with patient.  ABCDEs of melanoma reviewed with patient.  Patient encouraged to perform monthly skin exams.  UV precautions reviewed with patient.  Risks of non-melanoma skin cancer discussed with patient   Return to clinic in one year or sooner if needed.       Again, thank you for allowing me to participate in the care of your patient.        Sincerely,        Celine Dey PA-C    Electronically signed

## 2025-03-18 NOTE — PROGRESS NOTES
Maynor Paris is a pleasant 59 year old year old male patient here today for rough spots on face. He notes some rough areas on face, no painful or bleeding skin lesions. Uses ketoconazole shampoo occasionally for increased itching related to seb derm. Patient has no other skin complaints today.  Remainder of the HPI, Meds, PMH, Allergies, FH, and SH was reviewed in chart.    Pertinent Hx:   No personal history of skin cancer. Family history of melanoma   History reviewed. No pertinent past medical history.    No past surgical history on file.     Family History   Problem Relation Age of Onset    Skin Cancer Father         BCC, SCC, & Melanoma        Social History     Socioeconomic History    Marital status:      Spouse name: Not on file    Number of children: Not on file    Years of education: Not on file    Highest education level: Not on file   Occupational History    Not on file   Tobacco Use    Smoking status: Never    Smokeless tobacco: Never   Vaping Use    Vaping status: Never Used   Substance and Sexual Activity    Alcohol use: Not on file    Drug use: Not on file    Sexual activity: Not on file   Other Topics Concern    Not on file   Social History Narrative    Not on file     Social Drivers of Health     Financial Resource Strain: Low Risk  (6/11/2023)    [facility], [facility] [employer] [facility]    Financial Resource Strain     Difficulty of Paying Living Expenses: 3     Difficulty of Paying Living Expenses: Not on file   Food Insecurity: No Food Insecurity (6/11/2023)    [facility], [facility]    Food Insecurity     Worried About Running Out of Food in the Last Year: 1   Transportation Needs: No Transportation Needs (6/11/2023)    Received from [employer] [facility], [facility]    Transportation  Needs     Lack of Transportation (Medical): 1   Physical Activity: Not on file   Stress: Not on file   Social Connections: Unknown (6/11/2024)    Received from [employer] [facility]    Social Connections     Frequency of Communication with Friends and Family: Not on file   Interpersonal Safety: Not on file   Housing Stability: Low Risk  (6/11/2023)    Received from [facility], [facility] [employer] [facility]    Housing Stability     Unable to Pay for Housing in the Last Year: 1       Outpatient Encounter Medications as of 3/18/2025   Medication Sig Dispense Refill    ketoconazole (NIZORAL) 2 % external shampoo Leave on scalp for 5 minutes. Use 2-3 times weekly. 120 mL 5    levothyroxine (SYNTHROID/LEVOTHROID) 75 MCG tablet Take 75 mcg by mouth daily       No facility-administered encounter medications on file as of 3/18/2025.             O:   NAD, WDWN, Alert & Oriented, Mood & Affect wnl, Vitals stable   Here today alone   There were no vitals taken for this visit.   General appearance normal   Vitals stable   Alert, oriented and in no acute distress      Gritty papule on forehead, right cheek, left cheek, right nasal tip  Stuck on papules and brown macules on trunk and ext   Red papules on trunk  Brown papules and macules with regular pigment network and borders on torso and extremities  Few small scabs on scalp.     The remainder of skin exam is normal       Eyes: Conjunctivae/lids:Normal     ENT: Lips: normal    MSK:Normal    Cardiovascular: peripheral edema none    Pulm: Breathing Normal    Neuro/Psych: Orientation:Alert and Orientedx3 ; Mood/Affect:normal   A/P:  1. Actinic keratoses forehead, right cheek, left cheek, right nasal tip x 4  LN2:  Treated with LN2 for 5s for 1-2 cycles. Warned risks of blistering, pain, pigment change, scarring, and incomplete resolution.  Advised patient to return if lesions do not completely resolve.  Wound  care sheet given.  2. Seborrheic dermatitis   Use ketoconazole shampoo  leave on for 5 minutes then rinse.   Use lidex solution as needed.   3. Seborrheic keratosis, lentigo, angioma, benign nevi   It was a pleasure speaking to Maynor Paris today.  BENIGN LESIONS DISCUSSED WITH PATIENT:  I discussed the specifics of tumor, prognosis, and genetics of benign lesions.  I explained that treatment of these lesions would be purely cosmetic and not medically neccessary.  I discussed with patient different removal options including excision, cautery and /or laser.      Nature and genetics of benign skin lesions dicussed with patient.  Signs and Symptoms of skin cancer discussed with patient.  ABCDEs of melanoma reviewed with patient.  Patient encouraged to perform monthly skin exams.  UV precautions reviewed with patient.  Risks of non-melanoma skin cancer discussed with patient   Return to clinic in one year or sooner if needed.

## 2025-03-20 ENCOUNTER — OFFICE VISIT (OUTPATIENT)
Dept: AUDIOLOGY | Facility: CLINIC | Age: 60
End: 2025-03-20
Payer: COMMERCIAL

## 2025-03-20 DIAGNOSIS — H90.3 BILATERAL SENSORINEURAL HEARING LOSS: Primary | ICD-10-CM

## 2025-03-20 NOTE — PROGRESS NOTES
AUDIOLOGY REPORT    SUBJECTIVE:  Maynor Paris is a 59 year old male who was seen in the Audiology Clinic at the [facility] on 3/20/2025  for a follow-up check regarding the fitting of binaural Signia Pure Charge & Go 3 IX hearing aids 3/4/2024. Maynor had his hearing tested 2/24/2025 results showed normal sloping to moderate rising to minimal to normal sensorineural hearing loss right ear and left ear. Maynor reported improved clarity of conversation, and improved ability to follow conversation in groups and restaurants. He noted sounds like splashing water in a sink are pretty sharp. He requested longer  wires as they are tight and cause periodic discomfort.    OBJECTIVE:   Based on patient report, the following changes were made; no programming changes were made today, discussed decreasing high frequencies if needed at his next appointment. Reminded him of the balance feature on the madalyn to experiment.    Changed from #2 S receivers right and left to #3 S receivers right and left. Discussed there is a #4 if needed.    Reviewed 45 day trial period, care, cleaning (no water, dry brush), batteries (size rechargeable) insertion/removal, toxicity, low-battery signal), aid insertion/removal, volume adjustment (if applicable), user booklet, warranty information, storage cases, and other hearing aid details.     No charge visit today (in warranty hearing aid check).    ASSESSMENT:   A follow-up appointment for hearing aid fitting was completed today. Changes to hearing aid was completed as outlined above.     PLAN:  Maynor will return for follow-up in 4-6 weeks. Please call this clinic with any questions regarding today s appointment.        Wendy Vazquez.  [place] Licensed Audiologist #[id number]

## 2025-05-15 SDOH — HEALTH STABILITY: PHYSICAL HEALTH: ON AVERAGE, HOW MANY MINUTES DO YOU ENGAGE IN EXERCISE AT THIS LEVEL?: 30 MIN

## 2025-05-15 SDOH — HEALTH STABILITY: PHYSICAL HEALTH: ON AVERAGE, HOW MANY DAYS PER WEEK DO YOU ENGAGE IN MODERATE TO STRENUOUS EXERCISE (LIKE A BRISK WALK)?: 2 DAYS

## 2025-05-15 ASSESSMENT — SOCIAL DETERMINANTS OF HEALTH (SDOH): HOW OFTEN DO YOU GET TOGETHER WITH FRIENDS OR RELATIVES?: ONCE A WEEK

## 2025-05-20 ENCOUNTER — OFFICE VISIT (OUTPATIENT)
Dept: FAMILY MEDICINE | Facility: OTHER | Age: 60
End: 2025-05-20
Payer: COMMERCIAL

## 2025-05-20 VITALS
HEART RATE: 71 BPM | WEIGHT: 213 LBS | DIASTOLIC BLOOD PRESSURE: 86 MMHG | BODY MASS INDEX: 27.34 KG/M2 | HEIGHT: 74 IN | TEMPERATURE: 97.5 F | SYSTOLIC BLOOD PRESSURE: 135 MMHG | OXYGEN SATURATION: 99 % | RESPIRATION RATE: 17 BRPM

## 2025-05-20 DIAGNOSIS — Z12.5 SCREENING FOR PROSTATE CANCER: ICD-10-CM

## 2025-05-20 DIAGNOSIS — Z00.00 ROUTINE GENERAL MEDICAL EXAMINATION AT A HEALTH CARE FACILITY: Primary | ICD-10-CM

## 2025-05-20 DIAGNOSIS — E03.9 HYPOTHYROIDISM, UNSPECIFIED TYPE: ICD-10-CM

## 2025-05-20 LAB
CHOLEST SERPL-MCNC: 212 MG/DL
EST. AVERAGE GLUCOSE BLD GHB EST-MCNC: 111 MG/DL
FASTING STATUS PATIENT QL REPORTED: YES
FASTING STATUS PATIENT QL REPORTED: YES
GLUCOSE SERPL-MCNC: 100 MG/DL (ref 70–99)
HBA1C MFR BLD: 5.5 % (ref 0–5.6)
HDLC SERPL-MCNC: 56 MG/DL
LDLC SERPL CALC-MCNC: 121 MG/DL
NONHDLC SERPL-MCNC: 156 MG/DL
PSA SERPL DL<=0.01 NG/ML-MCNC: 3.16 NG/ML (ref 0–4.5)
TRIGL SERPL-MCNC: 175 MG/DL
TSH SERPL DL<=0.005 MIU/L-ACNC: 3.56 UIU/ML (ref 0.3–4.2)

## 2025-05-20 PROCEDURE — 1126F AMNT PAIN NOTED NONE PRSNT: CPT | Performed by: PHYSICIAN ASSISTANT

## 2025-05-20 PROCEDURE — 99396 PREV VISIT EST AGE 40-64: CPT | Performed by: PHYSICIAN ASSISTANT

## 2025-05-20 PROCEDURE — 84443 ASSAY THYROID STIM HORMONE: CPT | Performed by: PHYSICIAN ASSISTANT

## 2025-05-20 PROCEDURE — 3075F SYST BP GE 130 - 139MM HG: CPT | Performed by: PHYSICIAN ASSISTANT

## 2025-05-20 PROCEDURE — G0103 PSA SCREENING: HCPCS | Performed by: PHYSICIAN ASSISTANT

## 2025-05-20 PROCEDURE — 83036 HEMOGLOBIN GLYCOSYLATED A1C: CPT | Performed by: PHYSICIAN ASSISTANT

## 2025-05-20 PROCEDURE — 3079F DIAST BP 80-89 MM HG: CPT | Performed by: PHYSICIAN ASSISTANT

## 2025-05-20 PROCEDURE — 80061 LIPID PANEL: CPT | Performed by: PHYSICIAN ASSISTANT

## 2025-05-20 PROCEDURE — 36415 COLL VENOUS BLD VENIPUNCTURE: CPT | Performed by: PHYSICIAN ASSISTANT

## 2025-05-20 PROCEDURE — 82947 ASSAY GLUCOSE BLOOD QUANT: CPT | Performed by: PHYSICIAN ASSISTANT

## 2025-05-20 ASSESSMENT — PAIN SCALES - GENERAL: PAINLEVEL_OUTOF10: NO PAIN (0)

## 2025-05-20 NOTE — PATIENT INSTRUCTIONS
Patient Education   Preventive Care Advice   This is general advice given by our system to help you stay healthy. However, your care team may have specific advice just for you. Please talk to your care team about your preventive care needs.  Nutrition  Eat 5 or more servings of fruits and vegetables each day.  Try wheat bread, brown rice and whole grain pasta (instead of white bread, rice, and pasta).  Get enough calcium and vitamin D. Check the label on foods and aim for 100% of the RDA (recommended daily allowance).  Lifestyle  Exercise at least 150 minutes each week  (30 minutes a day, 5 days a week).  Do muscle strengthening activities 2 days a week. These help control your weight and prevent disease.  No smoking.  Wear sunscreen to prevent skin cancer.  Have a dental exam and cleaning every 6 months.  Yearly exams  See your health care team every year to talk about:  Any changes in your health.  Any medicines your care team has prescribed.  Preventive care, family planning, and ways to prevent chronic diseases.  Shots (vaccines)   HPV shots (up to age 26), if you've never had them before.  Hepatitis B shots (up to age 59), if you've never had them before.  COVID-19 shot: Get this shot when it's due.  Flu shot: Get a flu shot every year.  Tetanus shot: Get a tetanus shot every 10 years.  Pneumococcal, hepatitis A, and RSV shots: Ask your care team if you need these based on your risk.  Shingles shot (for age 50 and up)  General health tests  Diabetes screening:  Starting at age 35, Get screened for diabetes at least every 3 years.  If you are younger than age 35, ask your care team if you should be screened for diabetes.  Cholesterol test: At age 39, start having a cholesterol test every 5 years, or more often if advised.  Bone density scan (DEXA): At age 50, ask your care team if you should have this scan for osteoporosis (brittle bones).  Hepatitis C: Get tested at least once in your life.  STIs (sexually  transmitted infections)  Before age 24: Ask your care team if you should be screened for STIs.  After age 24: Get screened for STIs if you're at risk. You are at risk for STIs (including HIV) if:  You are sexually active with more than one person.  You don't use condoms every time.  You or a partner was diagnosed with a sexually transmitted infection.  If you are at risk for HIV, ask about PrEP medicine to prevent HIV.  Get tested for HIV at least once in your life, whether you are at risk for HIV or not.  Cancer screening tests  Cervical cancer screening: If you have a cervix, begin getting regular cervical cancer screening tests starting at age 21.  Breast cancer scan (mammogram): If you've ever had breasts, begin having regular mammograms starting at age 40. This is a scan to check for breast cancer.  Colon cancer screening: It is important to start screening for colon cancer at age 45.  Have a colonoscopy test every 10 years (or more often if you're at risk) Or, ask your provider about stool tests like a FIT test every year or Cologuard test every 3 years.  To learn more about your testing options, visit:   .  For help making a decision, visit:   https://bit.ly/ij64062.  Prostate cancer screening test: If you have a prostate, ask your care team if a prostate cancer screening test (PSA) at age 55 is right for you.  Lung cancer screening: If you are a current or former smoker ages 50 to 80, ask your care team if ongoing lung cancer screenings are right for you.  For informational purposes only. Not to replace the advice of your health care provider. Copyright   2023 [employer]. All rights reserved. Clinically reviewed by the [facility] Transitions Program. [employer] [id number] - REV 01/24.

## 2025-05-20 NOTE — PROGRESS NOTES
Preventive Care Visit  [facility]  Panchito Ratliff PA-C, Family Medicine  May 20, 2025    Assessment & Plan     Routine general medical examination at a health care facility  Patient is a 60 year old male who presents today for annual checkup. He informs me that he is newly retired and recently moved from [facility] to [place]. He and his spouse had property on [address] and built a home there some years ago. They are now using this as their group home home. 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 He did run into a wall, 6 months or so into group home, when he had run out of activities/hobbies to pursue. He is now structuring his days more and incorporating exercise as the local fitness center. He is motivated to maintain healthy body weight and nutrition. Praised him on his efforts. Reviewed healthy lifestyle recommendations with the patient. Reviewed health maintenance and updated per the patient's preferences.  - Glucose; Future  - Lipid panel reflex to direct LDL Non-fasting; Future  - Hemoglobin A1c; Future  - Glucose  - Lipid panel reflex to direct LDL Non-fasting  - Hemoglobin A1c    Hypothyroidism, unspecified type  Patient had been receiving care through [facility] over the years. Last saw PCP in 05/2024. Review of lab work from that time shows that the TSH was mildly elevated. As patient was not experiencing symptoms they opted not to adjust levothyroxine dose. I will repeat TSH/T4 today and will notify him of the results. Denies symptoms of low thyroid.   - TSH WITH FREE T4 REFLEX; Future  - TSH WITH FREE T4 REFLEX    Screening for prostate cancer  Reviewed the limitations of this lab. Previous PSA checks have been normal No new concerns.   - PSA, screen; Future  - PSA, screen    Patient has been advised of split billing requirements and indicates understanding:  Yes        Counseling  Appropriate preventive services were addressed with this patient via screening, questionnaire, or discussion as appropriate for fall prevention, nutrition, physical activity, Tobacco-use cessation, social engagement, weight loss and cognition.  Checklist reviewing preventive services available has been given to the patient.  Reviewed patient's diet, addressing concerns and/or questions.   He is at risk for lack of exercise and has been provided with information to increase physical activity for the benefit of his well-being.     Brina Mcguire is a 60 year old, presenting for the following:  Physical        5/20/2025     1:50 PM   Additional Questions   Roomed by nayeli   Accompanied by self      Patient would like to have provider as PCP    HPI    Hypothyroidism Follow-up    Since last visit, patient describes the following symptoms: Weight stable, no hair loss, no skin changes, no constipation, no loose stools    Advance Care Planning  Discussed advance care planning with patient; however, patient declined at this time.        5/15/2025   General Health   How would you rate your overall physical health? Good   Feel stress (tense, anxious, or unable to sleep) Only a little         5/15/2025   Nutrition   Three or more servings of calcium each day? Yes   Diet: Regular (no restrictions)   How many servings of fruit and vegetables per day? (!) 0-1   How many sweetened beverages each day? 0-1         5/15/2025   Exercise   Days per week of moderate/strenous exercise 2 days   Average minutes spent exercising at this level 30 min         5/15/2025   Social Factors   Frequency of gathering with friends or relatives Once a week   Worry food won't last until get money to buy more No   Food not last or not have enough money for food? No   Do you have housing? (Housing is defined as stable permanent housing and does not include staying outside in a car, in a tent, in an abandoned building, in an  "overnight shelter, or couch-surfing.) Yes   Are you worried about losing your housing? No   Lack of transportation? No   Unable to get utilities (heat,electricity)? No         5/15/2025   Fall Risk   Fallen 2 or more times in the past year? No   Trouble with walking or balance? No          5/15/2025   Dental   Dentist two times every year? Yes       Today's PHQ-2 Score:       2/24/2025     8:48 AM   PHQ-2 ( 1999 [employer])   Q1: Little interest or pleasure in doing things 0   Q2: Feeling down, depressed or hopeless 0   PHQ-2 Score 0         5/15/2025   Substance Use   Alcohol more than 3/day or more than 7/wk No   Do you use any other substances recreationally? No     Social History     Tobacco Use    Smoking status: Never    Smokeless tobacco: Never   Vaping Use    Vaping status: Never Used   Substance Use Topics    Alcohol use: Yes    Drug use: Never         5/15/2025   One time HIV Screening   Previous HIV test? Yes         5/15/2025   STI Screening   New sexual partner(s) since last STI/HIV test? No   Last PSA: No results found for: \"PSA\"  ASCVD Risk   The ASCVD Risk score (Jian BARBER, et al., 2019) failed to calculate for the following reasons:    Cannot find a previous HDL lab    Cannot find a previous total cholesterol lab       Reviewed and updated as needed this visit by Provider      Past Medical History:   Diagnosis Date    Thyroid disease          Review of Systems  Constitutional, HEENT, cardiovascular, pulmonary, gi and gu systems are negative, except as otherwise noted.     Objective    Exam  /86   Pulse 71   Temp 97.5  F (36.4  C) (Temporal)   Resp 17   Ht 1.885 m (6' 2.21\")   Wt 96.6 kg (213 lb)   SpO2 99%   BMI 27.19 kg/m     Estimated body mass index is 27.19 kg/m  as calculated from the following:    Height as of this encounter: 1.885 m (6' 2.21\").    Weight as of this encounter: 96.6 kg (213 lb).    Physical Exam  GENERAL: alert and no distress  EYES: Eyes grossly normal to " inspection, PERRL and conjunctivae and sclerae normal  HENT: ear canals and TM's normal, nose and mouth without ulcers or lesions  NECK: no adenopathy, no asymmetry, masses, or scars  RESP: lungs clear to auscultation - no rales, rhonchi or wheezes  CV: regular rate and rhythm, normal S1 S2, no S3 or S4, no murmur, click or rub, no peripheral edema  ABDOMEN: soft, nontender, no hepatosplenomegaly, no masses and bowel sounds normal  MS: no gross musculoskeletal defects noted, no edema  NEURO: Normal strength and tone, mentation intact and speech normal  PSYCH: mentation appears normal, affect normal/bright        Signed Electronically by: Panchito Ratliff PA-C

## 2025-05-21 ENCOUNTER — RESULTS FOLLOW-UP (OUTPATIENT)
Dept: FAMILY MEDICINE | Facility: OTHER | Age: 60
End: 2025-05-21
Payer: COMMERCIAL

## 2025-05-21 DIAGNOSIS — E03.9 HYPOTHYROIDISM, UNSPECIFIED TYPE: Primary | ICD-10-CM

## 2025-05-21 RX ORDER — LEVOTHYROXINE SODIUM 75 UG/1
75 TABLET ORAL DAILY
Qty: 90 TABLET | Refills: 3 | Status: SHIPPED | OUTPATIENT
Start: 2025-05-21

## 2025-06-04 ENCOUNTER — NURSE TRIAGE (OUTPATIENT)
Dept: NURSING | Facility: CLINIC | Age: 60
End: 2025-06-04
Payer: COMMERCIAL

## 2025-06-05 ENCOUNTER — RESULTS FOLLOW-UP (OUTPATIENT)
Dept: FAMILY MEDICINE | Facility: OTHER | Age: 60
End: 2025-06-05

## 2025-06-05 ENCOUNTER — TELEPHONE (OUTPATIENT)
Dept: FAMILY MEDICINE | Facility: CLINIC | Age: 60
End: 2025-06-05

## 2025-06-05 ENCOUNTER — OFFICE VISIT (OUTPATIENT)
Dept: FAMILY MEDICINE | Facility: OTHER | Age: 60
End: 2025-06-05
Payer: COMMERCIAL

## 2025-06-05 VITALS
SYSTOLIC BLOOD PRESSURE: 130 MMHG | WEIGHT: 214 LBS | OXYGEN SATURATION: 98 % | HEIGHT: 74 IN | HEART RATE: 81 BPM | RESPIRATION RATE: 18 BRPM | BODY MASS INDEX: 27.46 KG/M2 | TEMPERATURE: 97.8 F | DIASTOLIC BLOOD PRESSURE: 70 MMHG

## 2025-06-05 DIAGNOSIS — Z87.442 HISTORY OF NEPHROLITHIASIS: ICD-10-CM

## 2025-06-05 DIAGNOSIS — R11.0 NAUSEA: ICD-10-CM

## 2025-06-05 DIAGNOSIS — R50.9 FEVER, UNSPECIFIED FEVER CAUSE: Primary | ICD-10-CM

## 2025-06-05 LAB
ALBUMIN SERPL BCG-MCNC: 3.8 G/DL (ref 3.5–5.2)
ALBUMIN UR-MCNC: 100 MG/DL
ALP SERPL-CCNC: 275 U/L (ref 40–150)
ALT SERPL W P-5'-P-CCNC: 203 U/L (ref 0–70)
ANION GAP SERPL CALCULATED.3IONS-SCNC: 13 MMOL/L (ref 7–15)
APPEARANCE UR: CLEAR
AST SERPL W P-5'-P-CCNC: 117 U/L (ref 0–45)
BILIRUB SERPL-MCNC: 0.5 MG/DL
BILIRUB UR QL STRIP: ABNORMAL
BUN SERPL-MCNC: 9 MG/DL (ref 8–23)
CALCIUM SERPL-MCNC: 9.2 MG/DL (ref 8.8–10.4)
CHLORIDE SERPL-SCNC: 104 MMOL/L (ref 98–107)
CK SERPL-CCNC: 1228 U/L (ref 39–308)
COLOR UR AUTO: YELLOW
CREAT SERPL-MCNC: 0.94 MG/DL (ref 0.67–1.17)
EGFRCR SERPLBLD CKD-EPI 2021: >90 ML/MIN/1.73M2
ERYTHROCYTE [DISTWIDTH] IN BLOOD BY AUTOMATED COUNT: 13.3 % (ref 10–15)
GLUCOSE SERPL-MCNC: 131 MG/DL (ref 70–99)
GLUCOSE UR STRIP-MCNC: NEGATIVE MG/DL
HCO3 SERPL-SCNC: 22 MMOL/L (ref 22–29)
HCT VFR BLD AUTO: 43.5 % (ref 40–53)
HGB BLD-MCNC: 14.4 G/DL (ref 13.3–17.7)
HGB UR QL STRIP: ABNORMAL
KETONES UR STRIP-MCNC: 15 MG/DL
LEUKOCYTE ESTERASE UR QL STRIP: NEGATIVE
MCH RBC QN AUTO: 29.3 PG (ref 26.5–33)
MCHC RBC AUTO-ENTMCNC: 33.1 G/DL (ref 31.5–36.5)
MCV RBC AUTO: 88 FL (ref 78–100)
MUCOUS THREADS #/AREA URNS LPF: PRESENT /LPF
NITRATE UR QL: NEGATIVE
PH UR STRIP: 6.5 [PH] (ref 5–7)
PLATELET # BLD AUTO: 239 10E3/UL (ref 150–450)
POTASSIUM SERPL-SCNC: 3.8 MMOL/L (ref 3.4–5.3)
PROT SERPL-MCNC: 7.6 G/DL (ref 6.4–8.3)
RBC # BLD AUTO: 4.92 10E6/UL (ref 4.4–5.9)
RBC #/AREA URNS AUTO: ABNORMAL /HPF
SODIUM SERPL-SCNC: 139 MMOL/L (ref 135–145)
SP GR UR STRIP: 1.02 (ref 1–1.03)
UROBILINOGEN UR STRIP-ACNC: 2 E.U./DL
WBC # BLD AUTO: 6.4 10E3/UL (ref 4–11)
WBC #/AREA URNS AUTO: ABNORMAL /HPF

## 2025-06-05 ASSESSMENT — ENCOUNTER SYMPTOMS: FEVER: 1

## 2025-06-05 NOTE — PROGRESS NOTES
Assessment & Plan     Fever, unspecified fever cause  Nausea  Patient with fever, chills, nausea, mild abdominal pain x 3-4 days. Tmax at home 100.6F. He had one episode of emesis 3 days ago; has had some headache and dizziness, as well. Denies shortness of breath. He notes possible hematuria. He does have a history of nephrolithiasis. Exam today is fairly unremarkable with exception of lower abdominal tenderness which is mild. He notes a 4-lb weight loss this week, has been not eating much.  Wide differential is considered including nephrolithiasis, UTI, diverticulitis, gastroenteritis, other intraabdominal infection. He does have history of both inguinal and umbilical hernia repairs. Low threshold for consideration of CT abdomen, pending labs. We discussed strict return precautions.  CBC, CMP, UA, CK obtained today to rule out myopathy, UTI.   - Comprehensive metabolic panel (BMP + Alb, Alk Phos, ALT, AST, Total. Bili, TP); Future  - CBC with platelets; Future  - UA Macroscopic with reflex to Microscopic and Culture - Lab Collect; Future  - CK total; Future  - Comprehensive metabolic panel (BMP + Alb, Alk Phos, ALT, AST, Total. Bili, TP)  - CBC with platelets  - UA Macroscopic with reflex to Microscopic and Culture - Lab Collect  - CK total    History of nephrolithiasis    ADDENDUM: Labs resulted with significantly elevated CK level as well as elevated LFTs.  UA with moderate amount of blood.  No current concern for rhabdomyolysis, although patient does note he has been performing strenuous exercise- we discussed avoiding this currently.  Will obtain CT abdomen and pelvis with contrast for further evaluation.  Return precautions discussed with patient in the case of worsened symptoms.    Subjective   Maynor is a 60 year old, presenting for the following health issues:  Kidney Problem (Blood in urine), Headaches, Fever, and Dizziness      6/5/2025    10:40 AM   Additional Questions   Roomed by Dania Suarez  "by self     Kidney Problem  Associated symptoms include a fever.   Fever  Associated symptoms include a fever.   History of Present Illness       Headaches:   Since the patient's last clinic visit, headaches are: no change  The patient is getting headaches:  1/day  He is not able to do normal daily activities when he has a migraine.  The patient is taking the following rescue/relief medications:  Ibuprofen (Advil, Motrin) and Tylenol   Patient states \"I get some relief\" from the rescue/relief medications.   The patient is taking the following medications to prevent migraines:  No medications to prevent migraines  In the past 4 weeks, the patient has gone to an Urgent Care or Emergency Room 0 times times due to headaches.    Reason for visit:  Blood in urine  Symptom onset:  3-7 days ago  Symptoms include:  Fever chills headache light headed blood in urine  Symptom intensity:  Moderate  Symptom progression:  Staying the same  Had these symptoms before:  No  What makes it worse:  No  What makes it better:  Advil   He is taking medications regularly.        Patient reports malaise, chills, nausea, mild abdominal pain x 3-4 days. He has also had some headache and dizziness.    Triage call on file as well about symptoms and he reports he did bring his own urine sample if that can be used today, we'll have to check with the lab staff. His visit was marked as a new patient but he has been here in [place] before just in May.    Per triage   \"       Patient states that on Monday and has fever, shivering and started in one hour and vomited x2.  Patient taking advil but fever comes back.  Yesterday temp was 100.  Today fever is slight.  Now is having lightheadedness and unsteady on his feet.  Patient has noticed blood in his urine and also has fatigue.  Patient is so weak.  Patient took a covid test yesterday and today negative.                           Objective    /70   Pulse 81   Temp 97.8  F (36.6  C) (Temporal) " "  Resp 18   Ht 1.88 m (6' 2\")   Wt 97.1 kg (214 lb)   SpO2 98%   BMI 27.48 kg/m    Body mass index is 27.48 kg/m .  Physical Exam  Constitutional:       General: He is not in acute distress.     Appearance: Normal appearance. He is not ill-appearing.   Cardiovascular:      Rate and Rhythm: Normal rate and regular rhythm.   Pulmonary:      Effort: Pulmonary effort is normal. No respiratory distress.      Breath sounds: Normal breath sounds. No wheezing.   Abdominal:      General: Abdomen is flat. There is no distension.      Palpations: Abdomen is soft.      Tenderness: There is abdominal tenderness (lower abdomen bilaterally, mild). There is no right CVA tenderness, left CVA tenderness, guarding or rebound.   Skin:     General: Skin is warm and dry.   Neurological:      General: No focal deficit present.      Mental Status: He is alert and oriented to person, place, and time.   Psychiatric:         Mood and Affect: Mood normal.         Behavior: Behavior normal.                    Signed Electronically by: Yousuf Jones DO    "

## 2025-06-05 NOTE — TELEPHONE ENCOUNTER
Patient states that on Monday and has fever, shivering and started in one hour and vomited x2.  Patient taking advil but fever comes back.  Yesterday temp was 100.  Today fever is slight.  Now is having lightheadedness and unsteady on his feet.  Patient has noticed blood in his urine and also has fatigue.  Patient is so weak.  Patient took a covid test yesterday and today negative.          Reason for Disposition   [1] Pink or red-colored urine and likely from food (beets, rhubarb, red food dye) AND [2] lasts > 24 hours after stopping food    Additional Information   Negative: Shock suspected (e.g., cold/pale/clammy skin, too weak to stand, low BP, rapid pulse)   Negative: Sounds like a life-threatening emergency to the triager   Negative: Urinary catheter, questions about   Negative: Urinary catheter and spinal cord injury, questions about   Negative: Hospice patient with urinary catheter   Negative: Recent back or abdominal injury   Negative: Recent genital injury   Negative: [1] Unable to urinate (or only a few drops) > 4 hours AND [2] bladder feels very full (e.g., palpable bladder or strong urge to urinate)   Negative: [1] Diffuse (all over) muscle pains in the shoulders, arms, legs, and back AND [2] dark (cola or tea-colored) or red-colored urine   Negative: Passing pure blood or large blood clots (i.e., size > a dime)  (Exception: Jef or small strands.)   Negative: Fever > 100.4 F (38.0 C)   Negative: Patient sounds very sick or weak to the triager   Negative: [1] Pain or burning with passing urine AND [2] side (flank) or back pain present   Negative: Known sickle cell disease   Negative: Taking Coumadin (warfarin) or other strong blood thinner, or known bleeding disorder (e.g., thrombocytopenia)   Negative: Pain or burning with passing urine   Negative: Side (flank) or back pain present    Protocols used: Urine - Blood In-A-

## 2025-06-06 ENCOUNTER — HOSPITAL ENCOUNTER (OUTPATIENT)
Dept: CT IMAGING | Facility: CLINIC | Age: 60
Discharge: HOME | End: 2025-06-06
Payer: COMMERCIAL

## 2025-06-06 DIAGNOSIS — Z87.442 HISTORY OF NEPHROLITHIASIS: ICD-10-CM

## 2025-06-06 DIAGNOSIS — R11.0 NAUSEA: ICD-10-CM

## 2025-06-06 DIAGNOSIS — R50.9 FEVER, UNSPECIFIED FEVER CAUSE: ICD-10-CM

## 2025-06-06 PROCEDURE — 74177 CT ABD & PELVIS W/CONTRAST: CPT

## 2025-06-06 PROCEDURE — 250N000011 HC RX IP 250 OP 636

## 2025-06-06 PROCEDURE — 250N000009 HC RX 250

## 2025-06-06 RX ORDER — IOPAMIDOL 755 MG/ML
500 INJECTION, SOLUTION INTRAVASCULAR ONCE
Status: COMPLETED | OUTPATIENT
Start: 2025-06-06 | End: 2025-06-06

## 2025-06-06 RX ADMIN — SODIUM CHLORIDE 60 ML: 9 INJECTION, SOLUTION INTRAVENOUS at 08:30

## 2025-06-06 RX ADMIN — IOPAMIDOL 100 ML: 755 INJECTION, SOLUTION INTRAVENOUS at 08:31

## 2025-06-25 ENCOUNTER — LAB (OUTPATIENT)
Dept: LAB | Facility: CLINIC | Age: 60
End: 2025-06-25
Payer: COMMERCIAL

## 2025-06-25 ENCOUNTER — VIRTUAL VISIT (OUTPATIENT)
Dept: INTERNAL MEDICINE | Facility: CLINIC | Age: 60
End: 2025-06-25
Payer: COMMERCIAL

## 2025-06-25 DIAGNOSIS — R79.89 ELEVATED LFTS: Primary | ICD-10-CM

## 2025-06-25 DIAGNOSIS — R79.89 ELEVATED LFTS: ICD-10-CM

## 2025-06-25 DIAGNOSIS — R74.8 ELEVATED CK: ICD-10-CM

## 2025-06-25 DIAGNOSIS — Z87.19 H/O DIVERTICULITIS OF COLON: ICD-10-CM

## 2025-06-25 LAB
ALBUMIN SERPL BCG-MCNC: 4.3 G/DL (ref 3.5–5.2)
ALP SERPL-CCNC: 96 U/L (ref 40–150)
ALT SERPL W P-5'-P-CCNC: 29 U/L (ref 0–70)
ANION GAP SERPL CALCULATED.3IONS-SCNC: 10 MMOL/L (ref 7–15)
AST SERPL W P-5'-P-CCNC: 24 U/L (ref 0–45)
BILIRUB SERPL-MCNC: 0.5 MG/DL
BUN SERPL-MCNC: 17.2 MG/DL (ref 8–23)
CALCIUM SERPL-MCNC: 9.4 MG/DL (ref 8.8–10.4)
CHLORIDE SERPL-SCNC: 100 MMOL/L (ref 98–107)
CK SERPL-CCNC: 106 U/L (ref 39–308)
CREAT SERPL-MCNC: 0.96 MG/DL (ref 0.67–1.17)
EGFRCR SERPLBLD CKD-EPI 2021: 89 ML/MIN/1.73M2
GLUCOSE SERPL-MCNC: 100 MG/DL (ref 70–99)
HCO3 SERPL-SCNC: 28 MMOL/L (ref 22–29)
POTASSIUM SERPL-SCNC: 4.2 MMOL/L (ref 3.4–5.3)
PROT SERPL-MCNC: 7.8 G/DL (ref 6.4–8.3)
SODIUM SERPL-SCNC: 138 MMOL/L (ref 135–145)

## 2025-06-25 PROCEDURE — 80053 COMPREHEN METABOLIC PANEL: CPT

## 2025-06-25 PROCEDURE — 98005 SYNCH AUDIO-VIDEO EST LOW 20: CPT | Performed by: INTERNAL MEDICINE

## 2025-06-25 PROCEDURE — 1126F AMNT PAIN NOTED NONE PRSNT: CPT | Mod: 95 | Performed by: INTERNAL MEDICINE

## 2025-06-25 PROCEDURE — 82550 ASSAY OF CK (CPK): CPT

## 2025-06-25 PROCEDURE — 36415 COLL VENOUS BLD VENIPUNCTURE: CPT

## 2025-06-25 NOTE — PROGRESS NOTES
Maynor is a 60 year old who is being evaluated via a billable video visit.    How would you like to obtain your AVS? MyChart  If the video visit is dropped, the invitation should be resent by: Text to cell phone: [phone]  Will anyone else be joining your video visit? No      Assessment & Plan   Problem List Items Addressed This Visit    None  Visit Diagnoses         Elevated LFTs    -  Primary    Relevant Orders    Comprehensive metabolic panel (BMP + Alb, Alk Phos, ALT, AST, Total. Bili, TP)    CK total      Elevated CK        Relevant Orders    CK total      H/O diverticulitis of colon               Patient who has a history of hypothyroidism is on levothyroxine.  He is normally healthy.  He did have an episode of diverticulitis seen on CT scan but nothing seen on his liver he had some elevated LFTs including alkaline phosphatase, AST and ALT and his CK level was up to 1200.  He just darted a weightlifting program which maybe was the cause.  We will have him come in today and recheck his LFTs, kidney function, CK.  Hopefully his are back to normal he is not on overuse of alcohol he does not take Tylenol no other supplements.       The longitudinal plan of care for the diagnosis(es)/condition(s) as documented were addressed during this visit. Due to the added complexity in care, I will continue to support Maynor in the subsequent management and with ongoing continuity of care.    Subjective   Maynor is a 60 year old, presenting for the following health issues:  Follow Up (Lab work and diverticulitis - would like to redo lab work )        6/25/2025     7:49 AM   Additional Questions   Roomed by Rossi     History of Present Illness       Reason for visit:  Lab work and diverticulitis - would like to redo lab work    He eats 2-3 servings of fruits and vegetables daily.He consumes 0 sweetened beverage(s) daily.He exercises with enough effort to increase his heart rate 20 to 29 minutes per day.  He exercises with enough  effort to increase his heart rate 3 or less days per week.   He is taking medications regularly.      He just retired and now new health plan. Was at [facility] but now can come here.     Was seen in [place].     Diverticulitis 3 weeks ago and lfts were elevated for him, was starting a weight lifting program     Mild diverticulitis, feels great now. No antibiotics. Has colonoscopy every 5 years and polyps. This first case of diverticulitis.     Elevated ck level no clear reason.      Drinks occasion alcohol, 1-2 beers, not a bunch of alcohol use, no tylenol. No supplements no other medications.       Objective    Vitals - Patient Reported  Pain Score: No Pain (0)        Physical Exam   GENERAL: alert and no distress  EYES: Eyes grossly normal to inspection.  No discharge or erythema, or obvious scleral/conjunctival abnormalities.  RESP: No audible wheeze, cough, or visible cyanosis.    SKIN: Visible skin clear. No significant rash, abnormal pigmentation or lesions.  NEURO: Cranial nerves grossly intact.  Mentation and speech appropriate for age.  PSYCH: Appropriate affect, tone, and pace of words    Labs and CT scan are reviewed from the last 2 visits.  Other labs are pending today.      Video-Visit Details    Type of service:  Video Visit   Originating Location (pt. Location): Home    Distant Location (provider location):  On-site  Platform used for Video Visit: Mari  Signed Electronically by: Miguel Abdi MD

## 2025-06-26 ENCOUNTER — RESULTS FOLLOW-UP (OUTPATIENT)
Dept: FAMILY MEDICINE | Facility: CLINIC | Age: 60
End: 2025-06-26